# Patient Record
Sex: MALE | Race: WHITE | Employment: UNEMPLOYED | URBAN - NONMETROPOLITAN AREA
[De-identification: names, ages, dates, MRNs, and addresses within clinical notes are randomized per-mention and may not be internally consistent; named-entity substitution may affect disease eponyms.]

---

## 2021-12-09 ENCOUNTER — OFFICE VISIT (OUTPATIENT)
Dept: INTERNAL MEDICINE CLINIC | Age: 31
End: 2021-12-09
Payer: COMMERCIAL

## 2021-12-09 ENCOUNTER — CLINICAL DOCUMENTATION (OUTPATIENT)
Dept: INTERNAL MEDICINE CLINIC | Age: 31
End: 2021-12-09

## 2021-12-09 VITALS
TEMPERATURE: 98.4 F | HEIGHT: 74 IN | HEART RATE: 97 BPM | BODY MASS INDEX: 28.75 KG/M2 | DIASTOLIC BLOOD PRESSURE: 82 MMHG | SYSTOLIC BLOOD PRESSURE: 163 MMHG | WEIGHT: 224 LBS

## 2021-12-09 DIAGNOSIS — F11.20 SEVERE OPIOID USE DISORDER (HCC): Primary | ICD-10-CM

## 2021-12-09 LAB
ALCOHOL URINE: ABNORMAL
AMPHETAMINE SCREEN, URINE: ABNORMAL
BARBITURATE SCREEN, URINE: ABNORMAL
BENZODIAZEPINE SCREEN, URINE: ABNORMAL
BUPRENORPHINE URINE: ABNORMAL
COCAINE METABOLITE SCREEN URINE: ABNORMAL
FENTANYL SCREEN, URINE: ABNORMAL
GABAPENTIN SCREEN, URINE: ABNORMAL
MDMA URINE: ABNORMAL
METHADONE SCREEN, URINE: ABNORMAL
METHAMPHETAMINE, URINE: ABNORMAL
OPIATE SCREEN URINE: ABNORMAL
OXYCODONE SCREEN URINE: ABNORMAL
PHENCYCLIDINE SCREEN URINE: ABNORMAL
PROPOXYPHENE SCREEN, URINE: ABNORMAL
SYNTHETIC CANNABINOIDS(K2) SCREEN, URINE: ABNORMAL
THC SCREEN, URINE: ABNORMAL
TRAMADOL SCREEN URINE: ABNORMAL
TRICYCLIC ANTIDEPRESSANTS, UR: ABNORMAL

## 2021-12-09 PROCEDURE — 80305 DRUG TEST PRSMV DIR OPT OBS: CPT | Performed by: NURSE PRACTITIONER

## 2021-12-09 PROCEDURE — G8427 DOCREV CUR MEDS BY ELIG CLIN: HCPCS | Performed by: NURSE PRACTITIONER

## 2021-12-09 PROCEDURE — 99204 OFFICE O/P NEW MOD 45 MIN: CPT | Performed by: NURSE PRACTITIONER

## 2021-12-09 RX ORDER — NALTREXONE HYDROCHLORIDE 50 MG/1
50 TABLET, FILM COATED ORAL DAILY
Qty: 7 TABLET | Refills: 0 | Status: SHIPPED | OUTPATIENT
Start: 2021-12-09 | End: 2022-01-13

## 2021-12-09 RX ORDER — NALOXONE HYDROCHLORIDE 4 MG/.1ML
1 SPRAY NASAL PRN
Qty: 1 EACH | Refills: 1 | Status: SHIPPED | OUTPATIENT
Start: 2021-12-09

## 2021-12-09 RX ORDER — NALTREXONE HYDROCHLORIDE 50 MG/1
50 TABLET, FILM COATED ORAL DAILY
COMMUNITY
End: 2021-12-09 | Stop reason: SDUPTHER

## 2021-12-09 RX ORDER — GABAPENTIN 300 MG/1
300 CAPSULE ORAL 3 TIMES DAILY
Qty: 90 CAPSULE | Refills: 0 | Status: SHIPPED | OUTPATIENT
Start: 2021-12-09 | End: 2022-01-13

## 2021-12-09 RX ORDER — NALTREXONE 380 MG
380 KIT INTRAMUSCULAR ONCE
Qty: 1 EACH | Refills: 0 | Status: SHIPPED | OUTPATIENT
Start: 2021-12-09 | End: 2021-12-09

## 2021-12-09 ASSESSMENT — ENCOUNTER SYMPTOMS
SINUS PRESSURE: 0
ABDOMINAL PAIN: 0
TROUBLE SWALLOWING: 0
NAUSEA: 0
RHINORRHEA: 0
SINUS PAIN: 0
DIARRHEA: 0
COUGH: 0
CONSTIPATION: 0
ABDOMINAL DISTENTION: 0
SHORTNESS OF BREATH: 0
VOMITING: 0
BLOOD IN STOOL: 0
PHOTOPHOBIA: 0
WHEEZING: 0
SORE THROAT: 0

## 2021-12-09 NOTE — PROGRESS NOTES
Patient signed up for RESET O today. Clinician engaged with pt and reviewed OBOT clinic expectations for program participants. Clinician explained that upon starting with the program the patient will be expected to engage in individual and group counseling to enhance their recovery skills. Clinician reviewed the treatment menu with patient and identified local providers that they are comfortable seeing for counseling services. Provided pt with information about accessing services at their agency of choice. Clinician explained that upon completing their first counseling visit they need to bring a copy of a signed YUE that allows the counselor to release attendance records to the clinic and a copy of their treatment plan. Clinician explained that they need to bring a signed form verifying their attendance after each session with their counselor so that it can be uploaded into their chart. It was explained to the pt that they need to attend either treatment groups at a local agency where they receive counseling or attend a minimum of two community support groups per week and would need to bring a signed verification form. Pt was provided with a list of local 12 step meetings and the attendance verification form. Clinician explained the drug screen policy and informed them that their provider may request that they come in for a random drug screen or medication count. In the event that they are called for a random check, they will have 24hrs to come into the office. In the event that pt is not compliant with program expectations the frequency of their visits may be increased for added accountability, they may be asked to participate in a higher level of care, or they may be terminated from the treatment program.     Clinician allowed time for pt questions and had them sign the program expectations form and clinician signed as the witness.  A copy was made for patient to present to the counselor that they schedule with so that the pt can get the necessary documentation for the clinic. The form was given to support staff to be scanned into the pt chart. Patient is wanting to start on vivitrol. Patient has been on naltrexone. Prior Autho being started today. Patient appears to be anxious and reports being treated for it. Sw will continue to meet with patient in office as needed. Admission Reconciliation is Completed  Discharge Reconciliation is Not Complete Admission Reconciliation is Completed  Discharge Reconciliation is Completed
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
## 2021-12-10 ENCOUNTER — HOSPITAL ENCOUNTER (EMERGENCY)
Age: 31
Discharge: HOME | End: 2021-12-10
Payer: COMMERCIAL

## 2021-12-10 ENCOUNTER — APPOINTMENT (OUTPATIENT)
Dept: GENERAL RADIOLOGY | Age: 31
End: 2021-12-10
Payer: COMMERCIAL

## 2021-12-10 VITALS
DIASTOLIC BLOOD PRESSURE: 89 MMHG | TEMPERATURE: 99 F | WEIGHT: 225 LBS | HEIGHT: 74 IN | OXYGEN SATURATION: 98 % | RESPIRATION RATE: 16 BRPM | SYSTOLIC BLOOD PRESSURE: 143 MMHG | BODY MASS INDEX: 28.88 KG/M2 | HEART RATE: 83 BPM

## 2021-12-10 DIAGNOSIS — F11.20 SEVERE OPIOID USE DISORDER (HCC): Primary | ICD-10-CM

## 2021-12-10 DIAGNOSIS — M62.830 LUMBAR PARASPINAL MUSCLE SPASM: Primary | ICD-10-CM

## 2021-12-10 PROCEDURE — 99282 EMERGENCY DEPT VISIT SF MDM: CPT

## 2021-12-10 PROCEDURE — 6370000000 HC RX 637 (ALT 250 FOR IP): Performed by: PHYSICIAN ASSISTANT

## 2021-12-10 PROCEDURE — 6360000002 HC RX W HCPCS: Performed by: PHYSICIAN ASSISTANT

## 2021-12-10 PROCEDURE — 72100 X-RAY EXAM L-S SPINE 2/3 VWS: CPT

## 2021-12-10 PROCEDURE — 96372 THER/PROPH/DIAG INJ SC/IM: CPT

## 2021-12-10 RX ORDER — CYCLOBENZAPRINE HCL 10 MG
10 TABLET ORAL 3 TIMES DAILY PRN
Qty: 15 TABLET | Refills: 0 | Status: SHIPPED | OUTPATIENT
Start: 2021-12-10 | End: 2021-12-20

## 2021-12-10 RX ORDER — LIDOCAINE 4 G/G
1 PATCH TOPICAL ONCE
Status: DISCONTINUED | OUTPATIENT
Start: 2021-12-10 | End: 2021-12-10 | Stop reason: HOSPADM

## 2021-12-10 RX ORDER — NALTREXONE 380 MG
KIT INTRAMUSCULAR
Qty: 1 EACH | Refills: 5 | Status: SHIPPED | OUTPATIENT
Start: 2021-12-10

## 2021-12-10 RX ORDER — ORPHENADRINE CITRATE 30 MG/ML
60 INJECTION INTRAMUSCULAR; INTRAVENOUS ONCE
Status: COMPLETED | OUTPATIENT
Start: 2021-12-10 | End: 2021-12-10

## 2021-12-10 RX ORDER — LIDOCAINE 50 MG/G
1 PATCH TOPICAL DAILY
Qty: 15 PATCH | Refills: 0 | Status: SHIPPED | OUTPATIENT
Start: 2021-12-10

## 2021-12-10 RX ADMIN — ORPHENADRINE CITRATE 60 MG: 30 INJECTION INTRAMUSCULAR; INTRAVENOUS at 17:52

## 2021-12-10 ASSESSMENT — PAIN DESCRIPTION - DESCRIPTORS: DESCRIPTORS: SHARP

## 2021-12-10 ASSESSMENT — PAIN DESCRIPTION - FREQUENCY: FREQUENCY: CONTINUOUS

## 2021-12-10 ASSESSMENT — PAIN DESCRIPTION - LOCATION: LOCATION: BACK

## 2021-12-10 ASSESSMENT — PAIN SCALES - GENERAL: PAINLEVEL_OUTOF10: 7

## 2021-12-10 ASSESSMENT — ENCOUNTER SYMPTOMS: BACK PAIN: 1

## 2021-12-10 ASSESSMENT — PAIN DESCRIPTION - PAIN TYPE: TYPE: ACUTE PAIN

## 2021-12-10 ASSESSMENT — PAIN DESCRIPTION - ORIENTATION: ORIENTATION: LOWER
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
## 2021-12-10 NOTE — ED PROVIDER NOTES
[facility]  eMERGENCY dEPARTMENT eNCOUnter          CHIEF COMPLAINT       Chief Complaint   Patient presents with    Back Pain     lower       Nurses Notes reviewed and I agree except as noted inthe HPI. HISTORY OF PRESENT ILLNESS    Carmencita Cm is a 36 y.o. male who presents to the Emergency Department for the evaluation of back pain. Patient states that earlier today he was bending over when he felt his low back spasm. Denies any popping sound or sensation associated. States he has had similar symptoms in the past, the most recent episode has been years ago, associated with muscle spasm. He reports radicular pain down the bilateral posterior legs and reports worsening of pain with ambulation. He tried 800 mg ibuprofen with 4 tablets of Tylenol without relief of his pain. Denies any associated fever, numbness, tingling, weakness, incontinence. No history of aneurysm or connective tissue disorder. The HPI was provided by the patient. REVIEW OF SYSTEMS     Review of Systems   Musculoskeletal: Positive for back pain. All other systems reviewed and are negative. PAST MEDICAL HISTORY    has a past medical history of Chronic back pain. SURGICAL HISTORY      has a past surgical history that includes Tonsillectomy; Adenoidectomy; and hernia repair. CURRENT MEDICATIONS       Discharge Medication List as of 12/10/2021  6:32 PM      CONTINUE these medications which have NOT CHANGED    Details   naltrexone (VIVITROL) 380 MG injection Inject 380 mg every 28 days, Disp-1 each, R-5Print      naltrexone (DEPADE) 50 MG tablet Take 1 tablet by mouth daily, Disp-7 tablet, R-0Normal      gabapentin (NEURONTIN) 300 MG capsule Take 1 capsule by mouth 3 times daily for 30 days.  Intended supply: 30 days, Disp-90 capsule, R-0Normal      naloxone 4 MG/0.1ML LIQD nasal spray 1 spray by Nasal route as needed for Opioid Reversal, Disp-1 each, R-1Normal             ALLERGIES     has No Known Allergies. FAMILY HISTORY     has no family status information on file. family history is not on file. SOCIAL HISTORY      reports that he has been smoking cigarettes. He has been smoking about 1.00 pack per day. He uses smokeless tobacco. He reports previous alcohol use. He reports current drug use. Drug: Fentanyl. PHYSICAL EXAM     INITIAL VITALS:  height is 6' 2\" (1.88 m) and weight is 225 lb (102.1 kg). His oral temperature is 99 °F (37.2 °C). His blood pressure is 143/89 (abnormal) and his pulse is 83. His respiration is 16 and oxygen saturation is 98%. Physical Exam  Vitals and nursing note reviewed. Constitutional:       Appearance: Normal appearance. HENT:      Head: Normocephalic and atraumatic. Eyes:      Conjunctiva/sclera: Conjunctivae normal.   Cardiovascular:      Rate and Rhythm: Normal rate. Pulmonary:      Effort: Pulmonary effort is normal. No respiratory distress. Abdominal:      Palpations: Abdomen is soft. Tenderness: There is no abdominal tenderness. There is no guarding or rebound. Musculoskeletal:      Cervical back: Normal range of motion. Thoracic back: Normal.      Lumbar back: Spasms, tenderness and bony tenderness present. No deformity. Decreased range of motion. Positive right straight leg raise test and positive left straight leg raise test.        Back:    Skin:     General: Skin is warm and dry. Neurological:      General: No focal deficit present. Mental Status: He is alert and oriented to person, place, and time. GCS: GCS eye subscore is 4. GCS verbal subscore is 5. GCS motor subscore is 6. Sensory: No sensory deficit. Motor: No weakness.    Psychiatric:         Mood and Affect: Mood normal.         DIFFERENTIAL DIAGNOSIS:   Differential diagnoses are discussed    DIAGNOSTIC RESULTS     EKG: All EKG's are interpreted by the Emergency Department Physician who either signs or Co-signsthis chart in the absence of a cardiologist.        RADIOLOGY: non-plain film images(s) such as CT, Ultrasound and MRI are read by the radiologist.    XR LUMBAR SPINE (2-3 VIEWS)   Final Result      1. There is a nonspecific sclerotic appearance of the iliac bone adjacent to the right sacroiliac joint. 2. No acute fracture or subluxation in the lumbar spine. **This report has been created using voice recognition software. It may contain minor errors which are inherent in voice recognition technology. **      Final report electronically signed by Dr Esha Mohr on 12/10/2021 5:49 PM          LABS:    Labs Reviewed - No data to display    EMERGENCY DEPARTMENT COURSE:   Vitals:    Vitals:    12/10/21 1649   BP: (!) 143/89   Pulse: 83   Resp: 16   Temp: 99 °F (37.2 °C)   TempSrc: Oral   SpO2: 98%   Weight: 225 lb (102.1 kg)   Height: 6' 2\" (1.88 m)      5:26 PM EST: The patient was seen and evaluated. Patient presents for complaints of low back pain that began after bending over. Feels consistent with muscle spasms he has had in the past.  He does report some associated radicular pain without any numbness, weakness, loss of bowel or bladder control, saddle anesthesia or fever. He does have history of IV drug use, is currently scheduled to get started on Vivitrol and is requesting nonnarcotic treatment options. He has some localized tenderness in the lower lumbar spine again, without any systemic symptoms. History of similar symptoms in the past associated with muscle spasm. He had unremarkable x-ray and reported substantial improvement in his pain with Norflex and lidocaine patch. At this time, appears most consistent with muscular spasm. Certainly with a history of IV drug use, he is encouraged to come back to the ED for progressive or intractable pain, new neurologic changes/weakness, or onset of fever. He is agreeable with the above plan.   He will be discharged with Flexeril and lidocaine patches and denied further needs at this time. CRITICAL CARE:   None    CONSULTS:  None     PROCEDURES:  None    FINAL IMPRESSION      1. Lumbar paraspinal muscle spasm          DISPOSITION/PLAN   Discharge    PATIENT REFERRED TO:  [facility] EMERGENCY DEPT  [address]  [address]  [phone]    As needed      DISCHARGEMEDICATIONS:  Discharge Medication List as of 12/10/2021  6:32 PM      START taking these medications    Details   lidocaine (LIDODERM) 5 % Place 1 patch onto the skin daily 12 hours on, 12 hours off., Disp-15 patch, R-0Normal      cyclobenzaprine (FLEXERIL) 10 MG tablet Take 1 tablet by mouth 3 times daily as needed for Muscle spasms . WARNING: Medication may make you drowsy/sleepy. Do not take before driving or operating heavy machinery. , Disp-15 tablet, R-0Normal             (Please note that portions of this note were completedwith a voice recognition program.  Efforts were made to edit the dictations but occasionally words are mis-transcribed.)        Jeffery Damon PA-C  12/10/21 2033

## 2021-12-16 ENCOUNTER — OFFICE VISIT (OUTPATIENT)
Dept: INTERNAL MEDICINE CLINIC | Age: 31
End: 2021-12-16
Payer: COMMERCIAL

## 2021-12-16 VITALS
DIASTOLIC BLOOD PRESSURE: 83 MMHG | HEIGHT: 74 IN | WEIGHT: 223 LBS | BODY MASS INDEX: 28.62 KG/M2 | HEART RATE: 107 BPM | SYSTOLIC BLOOD PRESSURE: 140 MMHG | TEMPERATURE: 97.3 F

## 2021-12-16 DIAGNOSIS — G89.29 CHRONIC BILATERAL LOW BACK PAIN WITH BILATERAL SCIATICA: ICD-10-CM

## 2021-12-16 DIAGNOSIS — F11.20 SEVERE OPIOID USE DISORDER (HCC): Primary | ICD-10-CM

## 2021-12-16 DIAGNOSIS — M54.41 CHRONIC BILATERAL LOW BACK PAIN WITH BILATERAL SCIATICA: ICD-10-CM

## 2021-12-16 DIAGNOSIS — B18.2 CHRONIC HEPATITIS C WITHOUT HEPATIC COMA (HCC): ICD-10-CM

## 2021-12-16 DIAGNOSIS — M54.42 CHRONIC BILATERAL LOW BACK PAIN WITH BILATERAL SCIATICA: ICD-10-CM

## 2021-12-16 DIAGNOSIS — K04.7 DENTAL ABSCESS: ICD-10-CM

## 2021-12-16 PROCEDURE — G8484 FLU IMMUNIZE NO ADMIN: HCPCS | Performed by: NURSE PRACTITIONER

## 2021-12-16 PROCEDURE — 4004F PT TOBACCO SCREEN RCVD TLK: CPT | Performed by: NURSE PRACTITIONER

## 2021-12-16 PROCEDURE — 80305 DRUG TEST PRSMV DIR OPT OBS: CPT | Performed by: NURSE PRACTITIONER

## 2021-12-16 PROCEDURE — G8427 DOCREV CUR MEDS BY ELIG CLIN: HCPCS | Performed by: NURSE PRACTITIONER

## 2021-12-16 PROCEDURE — 99214 OFFICE O/P EST MOD 30 MIN: CPT | Performed by: NURSE PRACTITIONER

## 2021-12-16 PROCEDURE — G8419 CALC BMI OUT NRM PARAM NOF/U: HCPCS | Performed by: NURSE PRACTITIONER

## 2021-12-16 RX ORDER — AMOXICILLIN 500 MG/1
500 CAPSULE ORAL 2 TIMES DAILY
Qty: 14 CAPSULE | Refills: 0 | Status: SHIPPED | OUTPATIENT
Start: 2021-12-16 | End: 2021-12-23

## 2021-12-16 RX ORDER — METHYLPREDNISOLONE 4 MG/1
TABLET ORAL
Qty: 1 KIT | Refills: 0 | Status: SHIPPED | OUTPATIENT
Start: 2021-12-16 | End: 2021-12-22

## 2021-12-16 NOTE — PROGRESS NOTES
[address] INTERNAL MEDICINE AND MEDICATION MANAGEMENT  Adonay French  Dept: [phone]  Dept Fax: [phone]: [phone]     Visit Date:  12/16/2021    Patient:  Sirena Mercado  YOB: 1990    HPI:     Chief Complaint   Patient presents with    Drug Problem     Kendall Rosenberg presents today for medical evaluation of severe opioid use disorder, acute on chronic back pain, and dental abscess. I last seen him 1 week ago. Denies any use. Urges and cravings controlled with naltrexone 50 mg daily. Plan for vivitrol today. Hep C positive    Went to the ER 12/10 for lower back pain. Denies any injury. No specific aggravating or alleviating factors. States that he has \"flare ups\" occasionally. Pain with ROM. Stefanie Perez will not let him have gabapentin. Struggles with chronic back pain. XR Lumbar Spine 12/10      Impression       1. There is a nonspecific sclerotic appearance of the iliac bone adjacent to the right sacroiliac joint. 2. No acute fracture or subluxation in the lumbar spine. Dental abscess lower right. Swelling and pain present. Going to [place] today to see son     Medications    Current Outpatient Medications:     naltrexone (VIVITROL) 380 MG injection, Inject 380 mg every 28 days, Disp: 1 each, Rfl: 5    lidocaine (LIDODERM) 5 %, Place 1 patch onto the skin daily 12 hours on, 12 hours off., Disp: 15 patch, Rfl: 0    naltrexone (DEPADE) 50 MG tablet, Take 1 tablet by mouth daily, Disp: 7 tablet, Rfl: 0    gabapentin (NEURONTIN) 300 MG capsule, Take 1 capsule by mouth 3 times daily for 30 days. Intended supply: 30 days, Disp: 90 capsule, Rfl: 0    naloxone 4 MG/0.1ML LIQD nasal spray, 1 spray by Nasal route as needed for Opioid Reversal, Disp: 1 each, Rfl: 1    The patient has No Known Allergies.     Past Medical History  Kendall Rosenberg  has a past medical history of Chronic back pain.    Past Surgical History  The patient  has a past surgical history that includes Tonsillectomy; Adenoidectomy; and hernia repair. Family History  This patient's family history is not on file. Social History  Silvina Oneill  reports that he has been smoking cigarettes. He has been smoking about 1.00 pack per day. He uses smokeless tobacco. He reports previous alcohol use. He reports current drug use. Drug: Fentanyl. Health Maintenance:    Health Maintenance   Topic Date Due    Hepatitis A vaccine (1 of 2 - Risk 2-dose series) Never done    Varicella vaccine (1 of 2 - 2-dose childhood series) Never done    COVID-19 Vaccine (1) Never done    Pneumococcal 0-64 years Vaccine (1 of 2 - PPSV23) Never done    Depression Screen  Never done    HIV screen  Never done    Hepatitis B vaccine (1 of 3 - Risk 3-dose series) Never done    DTaP/Tdap/Td vaccine (1 - Tdap) Never done    Flu vaccine (1) Never done    Hepatitis C screen  Completed    Hib vaccine  Aged Out    Meningococcal (ACWY) vaccine  Aged Out       Subjective:      Review of Systems   Constitutional: Negative for chills, fatigue and fever. HENT: Positive for dental problem. Negative for congestion, rhinorrhea, sinus pressure, sinus pain, sore throat, tinnitus and trouble swallowing. Eyes: Negative for photophobia and visual disturbance. Respiratory: Negative for cough, shortness of breath and wheezing. Cardiovascular: Negative for chest pain, palpitations and leg swelling. Gastrointestinal: Negative for abdominal distention, abdominal pain, blood in stool, constipation, diarrhea, nausea and vomiting. Endocrine: Negative for polydipsia, polyphagia and polyuria. Genitourinary: Negative for difficulty urinating, dysuria, frequency, hematuria and urgency. Musculoskeletal: Positive for back pain (acute on chronic). Negative for arthralgias and myalgias. Skin: Negative for rash and wound.    Neurological: Negative for dizziness, light-headedness and headaches. Psychiatric/Behavioral: Positive for sleep disturbance. Negative for dysphoric mood. The patient is nervous/anxious. Objective:     BP (!) 140/83 (Site: Right Lower Arm, Position: Sitting, Cuff Size: Medium Adult)   Pulse 107   Temp 97.3 °F (36.3 °C) (Temporal)   Ht 6' 2\" (1.88 m)   Wt 223 lb (101.2 kg)   BMI 28.63 kg/m²     Physical Exam  Vitals reviewed. Constitutional:       General: He is not in acute distress. Appearance: Normal appearance. He is not ill-appearing. HENT:      Head: Normocephalic and atraumatic. Right Ear: External ear normal.      Left Ear: External ear normal.      Nose: Nose normal. No congestion or rhinorrhea. Mouth/Throat:      Mouth: Mucous membranes are moist.      Dentition: Dental caries and dental abscesses present. Eyes:      Extraocular Movements: Extraocular movements intact. Conjunctiva/sclera: Conjunctivae normal.      Pupils: Pupils are equal, round, and reactive to light. Pulmonary:      Effort: Pulmonary effort is normal. No respiratory distress. Musculoskeletal:      Cervical back: Normal range of motion and neck supple. Lumbar back: Decreased range of motion. Right lower leg: No edema. Left lower leg: No edema. Skin:     General: Skin is warm and dry. Neurological:      General: No focal deficit present. Mental Status: He is alert and oriented to person, place, and time. Psychiatric:         Mood and Affect: Mood normal.         Behavior: Behavior is hyperactive. Thought Content: Thought content normal.         Judgment: Judgment normal.         Labs Reviewed 12/16/2021:    Lab Results   Component Value Date    ALT 18 12/23/2021    AST 19 12/23/2021       Assessment/Plan      1. Severe opioid use disorder (HCC)    - POCT Rapid Drug Screen  - OARRS reviewed, no discrepancies  - Vivitrol injection given per nurse. Tolerated well.   - Narcan at home  - Continue counseling    2. Chronic bilateral low back pain with bilateral sciatica    - methylPREDNISolone (MEDROL DOSEPACK) 4 MG tablet; Take by mouth. Dispense: 1 kit; Refill: 0  - [facility], [facility].DORIAN    3. Chronic hepatitis C without hepatic coma ([facility] [address]    - External Referral To Gastroenterology    4. Dental abscess    - amoxicillin (AMOXIL) 500 MG capsule; Take 1 capsule by mouth 2 times daily for 7 days  Dispense: 14 capsule; Refill: 0      Return in about 2 weeks (around 12/30/2021). Patient given educational materials - see patient instructions. Discussed use, benefit, and side effects of prescribed medications. All patient questions answered. Pt voiced understanding. Reviewed health maintenance.        Electronically signed SHLOMO Jacobs CNP on 12/16/21 at 8:24 AM EST

## 2021-12-17 NOTE — PROGRESS NOTES
Verbal order per Chika Cifuentes CNP for urine drug screen. Negative for all drugs. Verified results with Lali Ochoa LPN. Chika Cifuentes CNP ordered Vivitrol 380 mg IM injection. Given in right buttock, no adverse reactions noted. Patient will be seen back in office on 12/30/21.

## 2021-12-23 ENCOUNTER — HOSPITAL ENCOUNTER (OUTPATIENT)
Age: 31
Discharge: HOME | End: 2021-12-23
Payer: COMMERCIAL

## 2021-12-23 DIAGNOSIS — F11.20 SEVERE OPIOID USE DISORDER (HCC): ICD-10-CM

## 2021-12-23 LAB
ALBUMIN SERPL-MCNC: 4.6 G/DL (ref 3.5–5.1)
ALP BLD-CCNC: 88 U/L (ref 38–126)
ALT SERPL-CCNC: 18 U/L (ref 11–66)
AST SERPL-CCNC: 19 U/L (ref 5–40)
BILIRUB SERPL-MCNC: 0.2 MG/DL (ref 0.3–1.2)
BILIRUBIN DIRECT: < 0.2 MG/DL (ref 0–0.3)
HEPATITIS C ANTIBODY: ABNORMAL
TOTAL PROTEIN: 7.8 G/DL (ref 6.1–8)

## 2021-12-23 PROCEDURE — 86803 HEPATITIS C AB TEST: CPT

## 2021-12-23 PROCEDURE — 80076 HEPATIC FUNCTION PANEL: CPT

## 2021-12-23 PROCEDURE — 87522 HEPATITIS C REVRS TRNSCRPJ: CPT

## 2021-12-23 PROCEDURE — 36415 COLL VENOUS BLD VENIPUNCTURE: CPT

## 2021-12-26 LAB
HCV QNT BY NAAT INTERPRETATION: NOT DETECTED
HCV QNT BY NAAT INTERPRETATION: NOT DETECTED
HCV QNT BY NAAT IU/ML: NOT DETECTED IU/ML
HCV QNT BY NAAT IU/ML: NOT DETECTED IU/ML
HCV QNT BY NAAT LOG IU/ML: NOT DETECTED LOG IU/ML
HCV QNT BY NAAT LOG IU/ML: NOT DETECTED LOG IU/ML

## 2021-12-30 ENCOUNTER — OFFICE VISIT (OUTPATIENT)
Dept: INTERNAL MEDICINE CLINIC | Age: 31
End: 2021-12-30
Payer: COMMERCIAL

## 2021-12-30 VITALS
TEMPERATURE: 98.4 F | HEART RATE: 89 BPM | WEIGHT: 223.4 LBS | HEIGHT: 74 IN | BODY MASS INDEX: 28.67 KG/M2 | SYSTOLIC BLOOD PRESSURE: 128 MMHG | RESPIRATION RATE: 20 BRPM | DIASTOLIC BLOOD PRESSURE: 82 MMHG

## 2021-12-30 DIAGNOSIS — F11.20 SEVERE OPIOID USE DISORDER (HCC): Primary | ICD-10-CM

## 2021-12-30 PROCEDURE — 4004F PT TOBACCO SCREEN RCVD TLK: CPT | Performed by: NURSE PRACTITIONER

## 2021-12-30 PROCEDURE — 99213 OFFICE O/P EST LOW 20 MIN: CPT | Performed by: NURSE PRACTITIONER

## 2021-12-30 PROCEDURE — G8427 DOCREV CUR MEDS BY ELIG CLIN: HCPCS | Performed by: NURSE PRACTITIONER

## 2021-12-30 PROCEDURE — G8484 FLU IMMUNIZE NO ADMIN: HCPCS | Performed by: NURSE PRACTITIONER

## 2021-12-30 PROCEDURE — G8419 CALC BMI OUT NRM PARAM NOF/U: HCPCS | Performed by: NURSE PRACTITIONER

## 2021-12-30 PROCEDURE — 80305 DRUG TEST PRSMV DIR OPT OBS: CPT | Performed by: NURSE PRACTITIONER

## 2021-12-30 NOTE — PROGRESS NOTES
[address]  [facility] INTERNAL MEDICINE AND MEDICATION MANAGEMENT  [address]  Dept: [phone]  Dept Fax: [phone]: [phone]     Visit Date:  12/30/2021    Patient:  Kirsten Roberts  YOB: 1990    HPI:     Valerie Penaloza presents today for medical evaluation of severe opioid use disorder    I last seen him 2 weeks ago    Urine negative for all substances    He denies any use    Urges and cravings well controlled with Vivitrol     Medications    Current Outpatient Medications:     QUEtiapine (SEROQUEL) 200 MG tablet, Take 1 tablet by mouth nightly, Disp: 30 tablet, Rfl: 1    ARIPiprazole (ABILIFY) 10 MG tablet, TAKE 1 TABLET BY MOUTH EVERY MORNING AS DIRECTED, Disp: , Rfl:     prazosin (MINIPRESS) 2 MG capsule, TAKE 1 CAPSULE BY MOUTH EVERY NIGHT AT BEDTIME AS DIRECTED FOR NIGHTMARES/FLASHBACKS, Disp: , Rfl:     QUEtiapine (SEROQUEL) 100 MG tablet, TAKE 1 TABLET BY MOUTH AT BEDTIME AS NEEDED FOR SLEEP, Disp: , Rfl:     DULoxetine (CYMBALTA) 30 MG extended release capsule, TAKE 1 CAPSULE BY MOUTH EVERY DAY AS DIRECTED, Disp: , Rfl:     ibuprofen (ADVIL;MOTRIN) 800 MG tablet, TAKE 1 TABLET BY MOUTH TWICE DAILY AS NEEDED FOR PAIN, Disp: , Rfl:     buPROPion (WELLBUTRIN SR) 150 MG extended release tablet, TAKE 1 TABLET BY MOUTH EVERY MORNING AS DIRECTED, Disp: , Rfl:     naltrexone (VIVITROL) 380 MG injection, Inject 380 mg every 28 days, Disp: 1 each, Rfl: 5    lidocaine (LIDODERM) 5 %, Place 1 patch onto the skin daily 12 hours on, 12 hours off., Disp: 15 patch, Rfl: 0    naloxone 4 MG/0.1ML LIQD nasal spray, 1 spray by Nasal route as needed for Opioid Reversal (Patient not taking: Reported on 1/6/2022), Disp: 1 each, Rfl: 1    The patient has No Known Allergies. Past Medical History  Valerie Penaloza  has a past medical history of Chronic back pain.     Past Surgical History  The patient  has a past surgical history that includes Tonsillectomy; Adenoidectomy; and hernia repair. Family History  This patient's family history is not on file. Social History  Benedict Monroy  reports that he has been smoking cigarettes. He has been smoking about 1.00 pack per day. He uses smokeless tobacco. He reports previous alcohol use. He reports previous drug use. Drug: Fentanyl. Health Maintenance:    Health Maintenance   Topic Date Due    Hepatitis A vaccine (1 of 2 - Risk 2-dose series) Never done    Varicella vaccine (1 of 2 - 2-dose childhood series) Never done    COVID-19 Vaccine (1) Never done    Pneumococcal 0-64 years Vaccine (1 of 2 - PPSV23) Never done    Depression Screen  Never done    HIV screen  Never done    Hepatitis B vaccine (1 of 3 - Risk 3-dose series) Never done    DTaP/Tdap/Td vaccine (1 - Tdap) Never done    Flu vaccine (1) Never done    Hepatitis C screen  Completed    Hib vaccine  Aged Out    Meningococcal (ACWY) vaccine  Aged Out       Subjective:      Review of Systems   Constitutional: Negative for chills, fatigue and fever. HENT: Negative for congestion, dental problem, rhinorrhea, sinus pressure, sinus pain, sore throat, tinnitus and trouble swallowing. Eyes: Negative for photophobia and visual disturbance. Respiratory: Negative for cough, shortness of breath and wheezing. Cardiovascular: Negative for chest pain, palpitations and leg swelling. Gastrointestinal: Negative for abdominal distention, abdominal pain, blood in stool, constipation, diarrhea, nausea and vomiting. Endocrine: Negative for polydipsia, polyphagia and polyuria. Genitourinary: Negative for difficulty urinating, dysuria, frequency, hematuria and urgency. Musculoskeletal: Positive for back pain (acute on chronic). Negative for arthralgias and myalgias. Skin: Negative for rash and wound. Neurological: Negative for dizziness, light-headedness and headaches.    Psychiatric/Behavioral: Negative for dysphoric mood and sleep disturbance. The patient is nervous/anxious. Objective:     /82 (Site: Right Lower Arm, Position: Sitting)   Pulse 89   Temp 98.4 °F (36.9 °C) (Tympanic)   Resp 20   Ht 6' 2\" (1.88 m)   Wt 223 lb 6.4 oz (101.3 kg)   BMI 28.68 kg/m²     Physical Exam  Vitals reviewed. Constitutional:       General: He is not in acute distress. Appearance: Normal appearance. He is not ill-appearing. HENT:      Head: Normocephalic and atraumatic. Right Ear: External ear normal.      Left Ear: External ear normal.      Nose: Nose normal. No congestion or rhinorrhea. Mouth/Throat:      Lips: Pink. Mouth: Mucous membranes are moist.   Eyes:      Extraocular Movements: Extraocular movements intact. Conjunctiva/sclera: Conjunctivae normal.      Pupils: Pupils are equal, round, and reactive to light. Pulmonary:      Effort: Pulmonary effort is normal. No respiratory distress. Musculoskeletal:      Cervical back: Normal range of motion and neck supple. Lumbar back: Decreased range of motion. Right lower leg: No edema. Left lower leg: No edema. Skin:     General: Skin is warm and dry. Neurological:      General: No focal deficit present. Mental Status: He is alert and oriented to person, place, and time. Psychiatric:         Mood and Affect: Mood normal.         Behavior: Behavior is hyperactive. Thought Content: Thought content normal.         Judgment: Judgment normal.         Labs Reviewed 12/30/2021:    Lab Results   Component Value Date    ALT 18 12/23/2021    AST 19 12/23/2021       Assessment/Plan      1. Severe opioid use disorder (HCC)    - POCT Rapid Drug Screen  - OARRS reviewed, no discrepancies  - Narcan at home  - Continue counseling      Return in about 2 weeks (around 1/13/2022). Patient given educational materials - see patient instructions. Discussed use, benefit, and side effects of prescribed medications.   All patient questions answered. Pt voiced understanding. Reviewed health maintenance.        Electronically signed SHLOMO Shaikh CNP on 12/30/21 at 8:09 AM EST

## 2022-01-02 ASSESSMENT — ENCOUNTER SYMPTOMS
SORE THROAT: 0
SHORTNESS OF BREATH: 0
RHINORRHEA: 0
COUGH: 0
CONSTIPATION: 0
ABDOMINAL DISTENTION: 0
WHEEZING: 0
BLOOD IN STOOL: 0
SINUS PAIN: 0
ABDOMINAL PAIN: 0
PHOTOPHOBIA: 0
DIARRHEA: 0
SINUS PRESSURE: 0
TROUBLE SWALLOWING: 0
VOMITING: 0
NAUSEA: 0
BACK PAIN: 1

## 2022-01-06 ENCOUNTER — OFFICE VISIT (OUTPATIENT)
Dept: PHYSICAL MEDICINE AND REHAB | Age: 32
End: 2022-01-06
Payer: COMMERCIAL

## 2022-01-06 VITALS
WEIGHT: 234 LBS | DIASTOLIC BLOOD PRESSURE: 80 MMHG | HEART RATE: 93 BPM | HEIGHT: 74 IN | BODY MASS INDEX: 30.03 KG/M2 | SYSTOLIC BLOOD PRESSURE: 110 MMHG | OXYGEN SATURATION: 97 %

## 2022-01-06 DIAGNOSIS — M47.819 FACET ARTHROPATHY: ICD-10-CM

## 2022-01-06 DIAGNOSIS — M47.816 LUMBAR SPONDYLOSIS: Primary | ICD-10-CM

## 2022-01-06 DIAGNOSIS — G89.4 CHRONIC PAIN SYNDROME: ICD-10-CM

## 2022-01-06 PROCEDURE — G8484 FLU IMMUNIZE NO ADMIN: HCPCS | Performed by: NURSE PRACTITIONER

## 2022-01-06 PROCEDURE — G8427 DOCREV CUR MEDS BY ELIG CLIN: HCPCS | Performed by: NURSE PRACTITIONER

## 2022-01-06 PROCEDURE — 4004F PT TOBACCO SCREEN RCVD TLK: CPT | Performed by: NURSE PRACTITIONER

## 2022-01-06 PROCEDURE — G8419 CALC BMI OUT NRM PARAM NOF/U: HCPCS | Performed by: NURSE PRACTITIONER

## 2022-01-06 PROCEDURE — 99204 OFFICE O/P NEW MOD 45 MIN: CPT | Performed by: NURSE PRACTITIONER

## 2022-01-06 RX ORDER — QUETIAPINE FUMARATE 100 MG/1
TABLET, FILM COATED ORAL
COMMUNITY
Start: 2021-12-30

## 2022-01-06 RX ORDER — ARIPIPRAZOLE 10 MG/1
TABLET ORAL
COMMUNITY
Start: 2021-12-30

## 2022-01-06 RX ORDER — DULOXETIN HYDROCHLORIDE 30 MG/1
CAPSULE, DELAYED RELEASE ORAL
COMMUNITY
Start: 2021-12-16

## 2022-01-06 RX ORDER — IBUPROFEN 800 MG/1
TABLET ORAL
COMMUNITY
Start: 2021-12-16

## 2022-01-06 RX ORDER — BUPROPION HYDROCHLORIDE 150 MG/1
TABLET, EXTENDED RELEASE ORAL
COMMUNITY
Start: 2021-12-03

## 2022-01-06 RX ORDER — PRAZOSIN HYDROCHLORIDE 2 MG/1
CAPSULE ORAL
COMMUNITY
Start: 2021-12-30
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
## 2022-01-13 ENCOUNTER — OFFICE VISIT (OUTPATIENT)
Dept: INTERNAL MEDICINE CLINIC | Age: 32
End: 2022-01-13
Payer: COMMERCIAL

## 2022-01-13 VITALS
HEART RATE: 116 BPM | BODY MASS INDEX: 29.36 KG/M2 | RESPIRATION RATE: 20 BRPM | DIASTOLIC BLOOD PRESSURE: 70 MMHG | TEMPERATURE: 98.1 F | HEIGHT: 74 IN | WEIGHT: 228.8 LBS | SYSTOLIC BLOOD PRESSURE: 112 MMHG

## 2022-01-13 DIAGNOSIS — G47.00 INSOMNIA, UNSPECIFIED TYPE: ICD-10-CM

## 2022-01-13 DIAGNOSIS — F11.20 SEVERE OPIOID USE DISORDER (HCC): Primary | ICD-10-CM

## 2022-01-13 PROCEDURE — 99214 OFFICE O/P EST MOD 30 MIN: CPT | Performed by: NURSE PRACTITIONER

## 2022-01-13 PROCEDURE — 80305 DRUG TEST PRSMV DIR OPT OBS: CPT | Performed by: NURSE PRACTITIONER

## 2022-01-13 PROCEDURE — 4004F PT TOBACCO SCREEN RCVD TLK: CPT | Performed by: NURSE PRACTITIONER

## 2022-01-13 PROCEDURE — G8427 DOCREV CUR MEDS BY ELIG CLIN: HCPCS | Performed by: NURSE PRACTITIONER

## 2022-01-13 PROCEDURE — G8417 CALC BMI ABV UP PARAM F/U: HCPCS | Performed by: NURSE PRACTITIONER

## 2022-01-13 PROCEDURE — G8484 FLU IMMUNIZE NO ADMIN: HCPCS | Performed by: NURSE PRACTITIONER

## 2022-01-13 RX ORDER — QUETIAPINE FUMARATE 200 MG/1
200 TABLET, FILM COATED ORAL NIGHTLY
Qty: 30 TABLET | Refills: 1 | Status: SHIPPED | OUTPATIENT
Start: 2022-01-13

## 2022-01-13 ASSESSMENT — ENCOUNTER SYMPTOMS
DIARRHEA: 0
SINUS PRESSURE: 0
VOMITING: 0
BLOOD IN STOOL: 0
SORE THROAT: 0
CONSTIPATION: 0
RHINORRHEA: 0
PHOTOPHOBIA: 0
ABDOMINAL PAIN: 0
BACK PAIN: 1
WHEEZING: 0
NAUSEA: 0
TROUBLE SWALLOWING: 0
SINUS PAIN: 0
COUGH: 0
SHORTNESS OF BREATH: 0
ABDOMINAL DISTENTION: 0

## 2022-01-13 NOTE — PROGRESS NOTES
[address] INTERNAL MEDICINE AND MEDICATION MANAGEMENT  [address]  Dept: [phone]  Dept Fax: [phone]: [phone]     Visit Date:  1/13/2022    Patient:  Shayan Aldana  YOB: 1990    HPI:     Chief Complaint   Patient presents with    Drug Problem     Kamila Schneider presents today for medical evaluation of severe opioid use disorder, acute on chronic back pain, and insomnia    I last seen him 2 weeks ago. Denies any use. Urges and cravings controlled with vivitrol. Today is his second injection. Hep C positive, negative viral load    Went to the ER 12/10 for lower back pain. Denies any injury. No specific aggravating or alleviating factors. States that he has \"flare ups\" occasionally. Pain with ROM. Yamilex Dumont will not let him have gabapentin. Struggles with chronic back pain. Scheduled for injections 1/25.      XR Lumbar Spine 12/10      Impression       1. There is a nonspecific sclerotic appearance of the iliac bone adjacent to the right sacroiliac joint. 2. No acute fracture or subluxation in the lumbar spine. He has been taking Seroquel 100 mg nightly, however feels that it is not enough. He is still not sleeping through the night.       Medications    Current Outpatient Medications:     QUEtiapine (SEROQUEL) 200 MG tablet, Take 1 tablet by mouth nightly, Disp: 30 tablet, Rfl: 1    ARIPiprazole (ABILIFY) 10 MG tablet, TAKE 1 TABLET BY MOUTH EVERY MORNING AS DIRECTED, Disp: , Rfl:     prazosin (MINIPRESS) 2 MG capsule, TAKE 1 CAPSULE BY MOUTH EVERY NIGHT AT BEDTIME AS DIRECTED FOR NIGHTMARES/FLASHBACKS, Disp: , Rfl:     QUEtiapine (SEROQUEL) 100 MG tablet, TAKE 1 TABLET BY MOUTH AT BEDTIME AS NEEDED FOR SLEEP, Disp: , Rfl:     DULoxetine (CYMBALTA) 30 MG extended release capsule, TAKE 1 CAPSULE BY MOUTH EVERY DAY AS DIRECTED, Disp: , Rfl:     ibuprofen (ADVIL;MOTRIN) 800 MG tablet, TAKE 1 TABLET BY MOUTH TWICE DAILY AS NEEDED FOR PAIN, Disp: , Rfl:     buPROPion (WELLBUTRIN SR) 150 MG extended release tablet, TAKE 1 TABLET BY MOUTH EVERY MORNING AS DIRECTED, Disp: , Rfl:     naltrexone (VIVITROL) 380 MG injection, Inject 380 mg every 28 days, Disp: 1 each, Rfl: 5    lidocaine (LIDODERM) 5 %, Place 1 patch onto the skin daily 12 hours on, 12 hours off., Disp: 15 patch, Rfl: 0    naloxone 4 MG/0.1ML LIQD nasal spray, 1 spray by Nasal route as needed for Opioid Reversal (Patient not taking: Reported on 1/6/2022), Disp: 1 each, Rfl: 1    The patient has No Known Allergies. Past Medical History  Matt Wade  has a past medical history of Chronic back pain. Past Surgical History  The patient  has a past surgical history that includes Tonsillectomy; Adenoidectomy; and hernia repair. Family History  This patient's family history is not on file. Social History  Matt Wade  reports that he has been smoking cigarettes. He has been smoking about 1.00 pack per day. He uses smokeless tobacco. He reports previous alcohol use. He reports previous drug use. Drug: Fentanyl. Health Maintenance:    Health Maintenance   Topic Date Due    Hepatitis A vaccine (1 of 2 - Risk 2-dose series) Never done    Varicella vaccine (1 of 2 - 2-dose childhood series) Never done    COVID-19 Vaccine (1) Never done    Pneumococcal 0-64 years Vaccine (1 of 2 - PPSV23) Never done    Depression Screen  Never done    HIV screen  Never done    Hepatitis B vaccine (1 of 3 - Risk 3-dose series) Never done    DTaP/Tdap/Td vaccine (1 - Tdap) Never done    Flu vaccine (1) Never done    Hepatitis C screen  Completed    Hib vaccine  Aged Out    Meningococcal (ACWY) vaccine  Aged Out       Subjective:      Review of Systems   Constitutional: Negative for chills, fatigue and fever.    HENT: Negative for congestion, dental problem, rhinorrhea, sinus pressure, sinus pain, sore throat, tinnitus and trouble

## 2022-01-17 ENCOUNTER — TELEPHONE (OUTPATIENT)
Dept: PHYSICAL MEDICINE AND REHAB | Age: 32
End: 2022-01-17

## 2022-01-17 ASSESSMENT — ENCOUNTER SYMPTOMS
COUGH: 0
NAUSEA: 0
PHOTOPHOBIA: 0
BLOOD IN STOOL: 0
SHORTNESS OF BREATH: 0
ABDOMINAL PAIN: 0
SINUS PAIN: 0
WHEEZING: 0
BACK PAIN: 1
SORE THROAT: 0
VOMITING: 0
TROUBLE SWALLOWING: 0
SINUS PRESSURE: 0
RHINORRHEA: 0
ABDOMINAL DISTENTION: 0
DIARRHEA: 0
CONSTIPATION: 0

## 2022-01-17 NOTE — TELEPHONE ENCOUNTER
Ad procedure denial received. Scanned into media. Denied due to need for 6 weeks of PT. And have tried 3 or more weeks of NSAID's for pain. Pt. Contacted. Does NOT want physical therapy, states he doesn't have time for it. Procedure and follow up cancelled. Does not want any further recommendations at this time.

## 2022-01-18 NOTE — TELEPHONE ENCOUNTER
Thank you for the update. If patient would like to schedule an appointment with me to discuss this in the future and he is more than welcome to do so.     SHLOMO Heller - CNP

## 2022-03-21 DIAGNOSIS — G47.00 INSOMNIA, UNSPECIFIED TYPE: ICD-10-CM

## 2022-03-21 RX ORDER — QUETIAPINE FUMARATE 200 MG/1
TABLET, FILM COATED ORAL
Qty: 30 TABLET | Refills: 1 | OUTPATIENT
Start: 2022-03-21